# Patient Record
Sex: MALE | Race: WHITE | NOT HISPANIC OR LATINO
[De-identification: names, ages, dates, MRNs, and addresses within clinical notes are randomized per-mention and may not be internally consistent; named-entity substitution may affect disease eponyms.]

---

## 2019-02-11 ENCOUNTER — APPOINTMENT (OUTPATIENT)
Dept: VASCULAR SURGERY | Facility: CLINIC | Age: 64
End: 2019-02-11

## 2020-06-09 ENCOUNTER — TRANSCRIPTION ENCOUNTER (OUTPATIENT)
Age: 65
End: 2020-06-09

## 2020-11-04 ENCOUNTER — TRANSCRIPTION ENCOUNTER (OUTPATIENT)
Age: 65
End: 2020-11-04

## 2022-04-14 ENCOUNTER — APPOINTMENT (OUTPATIENT)
Dept: ORTHOPEDIC SURGERY | Facility: CLINIC | Age: 67
End: 2022-04-14

## 2022-04-15 ENCOUNTER — APPOINTMENT (OUTPATIENT)
Dept: ORTHOPEDIC SURGERY | Facility: CLINIC | Age: 67
End: 2022-04-15
Payer: MEDICARE

## 2022-04-15 VITALS — HEIGHT: 71 IN | BODY MASS INDEX: 28.98 KG/M2 | WEIGHT: 207 LBS

## 2022-04-15 PROCEDURE — 99213 OFFICE O/P EST LOW 20 MIN: CPT

## 2022-04-15 PROCEDURE — 73630 X-RAY EXAM OF FOOT: CPT | Mod: RT

## 2022-04-15 NOTE — ASSESSMENT
[FreeTextEntry1] : Discussed cheilectomy/prox phalanx osteotomy. Full lengh custom orthotics with mortons extension and sesamoid relief.

## 2022-04-15 NOTE — IMAGING
[Bilateral] : foot bilaterally [Weight -] : weightbearing [Degenerative change] : Degenerative change [de-identified] : hallux mtp

## 2022-04-15 NOTE — PHYSICAL EXAM
[Right] : right foot and ankle [Moderate] : moderate swelling of MTP joint/great toe [Left] : left foot and ankle [1st] : 1st [5___] : plantar flexion 5[unfilled]/5 [2+] : dorsalis pedis pulse: 2+ [Decreased] : saphenous nerve sensation decreased [] : no calf tenderness [FreeTextEntry8] : ttp plantar hallux MTPJ medial and lateral sesamoids [de-identified] : MTP joint DF 20 degrees

## 2022-04-15 NOTE — HISTORY OF PRESENT ILLNESS
[Gradual] : gradual [7] : 7 [5] : 5 [de-identified] : 7/14/21: 64 y/o M with ongoing BILAT foot issues since he was young. Pain is worse great toes and bottom feet in the same area. For years he managed his pain with cortisone injections. Insoles. Using gel cushion. New Balance sneakers. He does acupuncture. He cannot walk for exercise any more. Over the past 4 months developed atraumatic onset of RIGHT ankle pain.   Occupation: Retired [employer]  pmh: Neuropathy B/L feet, had EMG, followed by Dr. Yvon Peraza, HTN - controlled, Ureter CA, no DM, d/c tobacco 20 years ago.\par \par 4/15/22: f/u bilateral foot, symptoms getting progressively worse and different. The right side is worse, pain to the ball of the foot underneath the big toe as well as the arch. He has been doing to acupuncture, PT, stretching. Topical creams/aleve. Hx CIPD, Neuropathy. He has been using orthotics/inserts. [] : no [FreeTextEntry1] : bilateral feet, right is worse then the left  [de-identified] : dr navas [de-identified] : xray

## 2022-04-27 ENCOUNTER — APPOINTMENT (OUTPATIENT)
Dept: ORTHOPEDIC SURGERY | Facility: CLINIC | Age: 67
End: 2022-04-27

## 2022-05-18 ENCOUNTER — RESULT REVIEW (OUTPATIENT)
Age: 67
End: 2022-05-18

## 2022-05-25 ENCOUNTER — APPOINTMENT (OUTPATIENT)
Dept: ORTHOPEDIC SURGERY | Facility: CLINIC | Age: 67
End: 2022-05-25
Payer: MEDICARE

## 2022-05-25 VITALS — BODY MASS INDEX: 28.98 KG/M2 | WEIGHT: 207 LBS | HEIGHT: 71 IN

## 2022-05-25 PROCEDURE — 99213 OFFICE O/P EST LOW 20 MIN: CPT

## 2022-05-25 NOTE — HISTORY OF PRESENT ILLNESS
[Gradual] : gradual [0] : 0 [Retired] : Work status: retired [de-identified] : 7/14/21: 64 y/o M with ongoing BILAT foot issues since he was young. Pain is worse great toes and bottom feet in the same area. For years he managed his pain with cortisone injections. Insoles. Using gel cushion. New Balance sneakers. He does acupuncture. He cannot walk for exercise any more. Over the past 4 months developed atraumatic onset of RIGHT ankle pain.   Occupation: Retired [employer] Sales  pmh: Neuropathy B/L feet, had EMG, followed by Dr. Yvon Peraza, HTN - controlled, Ureter CA, no DM, d/c tobacco 20 years ago.\par \par 4/15/22: f/u bilateral foot, symptoms getting progressively worse and different. The right side is worse, pain to the ball of the foot underneath the big toe as well as the arch. He has been doing to acupuncture, PT, stretching. Topical creams/aleve. Hx CIPD, Neuropathy. He has been using orthotics/inserts.\par 5/25/22  f/u rajeev feet.  Has new orthotics/Hoka sneakers [] : no [FreeTextEntry1] : bilateral feet, right is worse then the left  [FreeTextEntry9] : orthotics and new sneakers [de-identified] : prlonged walking [de-identified] : dr navas [de-identified] : xray in the past [de-identified] : pt has gotten orthotics and they are helping. c/o of rt ankle pain

## 2022-05-25 NOTE — PHYSICAL EXAM
[Right] : right foot and ankle [Moderate] : moderate swelling of MTP joint/great toe [Left] : left foot and ankle [1st] : 1st [5___] : plantar flexion 5[unfilled]/5 [2+] : dorsalis pedis pulse: 2+ [Decreased] : saphenous nerve sensation decreased [] : no calf tenderness [FreeTextEntry8] : ttp plantar hallux MTPJ medial and lateral sesamoids [de-identified] : MTP joint DF 20 degrees

## 2022-06-21 ENCOUNTER — NON-APPOINTMENT (OUTPATIENT)
Age: 67
End: 2022-06-21

## 2022-06-21 ENCOUNTER — APPOINTMENT (OUTPATIENT)
Dept: GASTROENTEROLOGY | Facility: CLINIC | Age: 67
End: 2022-06-21

## 2022-06-21 VITALS
SYSTOLIC BLOOD PRESSURE: 130 MMHG | HEART RATE: 80 BPM | OXYGEN SATURATION: 95 % | BODY MASS INDEX: 29.03 KG/M2 | DIASTOLIC BLOOD PRESSURE: 85 MMHG | WEIGHT: 207.38 LBS | HEIGHT: 71 IN

## 2022-06-21 DIAGNOSIS — K21.9 GASTRO-ESOPHAGEAL REFLUX DISEASE W/OUT ESOPHAGITIS: ICD-10-CM

## 2022-06-21 DIAGNOSIS — G61.81 CHRONIC INFLAMMATORY DEMYELINATING POLYNEURITIS: ICD-10-CM

## 2022-06-21 PROCEDURE — 99204 OFFICE O/P NEW MOD 45 MIN: CPT

## 2022-07-05 ENCOUNTER — NON-APPOINTMENT (OUTPATIENT)
Age: 67
End: 2022-07-05

## 2022-07-11 LAB — SARS-COV-2 N GENE NPH QL NAA+PROBE: NOT DETECTED

## 2022-07-12 ENCOUNTER — NON-APPOINTMENT (OUTPATIENT)
Age: 67
End: 2022-07-12

## 2022-07-13 ENCOUNTER — OUTPATIENT (OUTPATIENT)
Dept: OUTPATIENT SERVICES | Facility: HOSPITAL | Age: 67
LOS: 1 days | End: 2022-07-13
Payer: MEDICARE

## 2022-07-13 ENCOUNTER — TRANSCRIPTION ENCOUNTER (OUTPATIENT)
Age: 67
End: 2022-07-13

## 2022-07-13 ENCOUNTER — APPOINTMENT (OUTPATIENT)
Dept: GASTROENTEROLOGY | Facility: HOSPITAL | Age: 67
End: 2022-07-13

## 2022-07-13 ENCOUNTER — NON-APPOINTMENT (OUTPATIENT)
Age: 67
End: 2022-07-13

## 2022-07-13 VITALS
WEIGHT: 205.03 LBS | OXYGEN SATURATION: 98 % | TEMPERATURE: 99 F | HEIGHT: 71 IN | HEART RATE: 66 BPM | DIASTOLIC BLOOD PRESSURE: 84 MMHG | SYSTOLIC BLOOD PRESSURE: 145 MMHG | RESPIRATION RATE: 16 BRPM

## 2022-07-13 DIAGNOSIS — G61.81 CHRONIC INFLAMMATORY DEMYELINATING POLYNEURITIS: Chronic | ICD-10-CM

## 2022-07-13 DIAGNOSIS — Z98.890 OTHER SPECIFIED POSTPROCEDURAL STATES: Chronic | ICD-10-CM

## 2022-07-13 DIAGNOSIS — K21.9 GASTRO-ESOPHAGEAL REFLUX DISEASE WITHOUT ESOPHAGITIS: ICD-10-CM

## 2022-07-13 DIAGNOSIS — S83.511A SPRAIN OF ANTERIOR CRUCIATE LIGAMENT OF RIGHT KNEE, INITIAL ENCOUNTER: Chronic | ICD-10-CM

## 2022-07-13 DIAGNOSIS — Z90.49 ACQUIRED ABSENCE OF OTHER SPECIFIED PARTS OF DIGESTIVE TRACT: Chronic | ICD-10-CM

## 2022-07-13 PROCEDURE — C1889: CPT

## 2022-07-13 PROCEDURE — 91010 ESOPHAGUS MOTILITY STUDY: CPT | Mod: 26

## 2022-07-13 PROCEDURE — 91037 ESOPH IMPED FUNCTION TEST: CPT | Mod: 26

## 2022-07-13 PROCEDURE — 91037 ESOPH IMPED FUNCTION TEST: CPT

## 2022-07-13 PROCEDURE — 91010 ESOPHAGUS MOTILITY STUDY: CPT

## 2022-07-13 DEVICE — CATH VERSAFLEX Z PH: Type: IMPLANTABLE DEVICE | Status: FUNCTIONAL

## 2022-07-13 RX ORDER — PANTOPRAZOLE SODIUM 20 MG/1
1 TABLET, DELAYED RELEASE ORAL
Qty: 0 | Refills: 0 | DISCHARGE

## 2022-07-13 RX ORDER — FENOFIBRATE,MICRONIZED 130 MG
1 CAPSULE ORAL
Qty: 0 | Refills: 0 | DISCHARGE

## 2022-07-13 RX ORDER — ASPIRIN/CALCIUM CARB/MAGNESIUM 324 MG
1 TABLET ORAL
Qty: 0 | Refills: 0 | DISCHARGE

## 2022-07-13 RX ORDER — OLMESARTAN MEDOXOMIL 5 MG/1
1 TABLET, FILM COATED ORAL
Qty: 0 | Refills: 0 | DISCHARGE

## 2022-07-13 RX ORDER — FAMOTIDINE 10 MG/ML
1 INJECTION INTRAVENOUS
Qty: 0 | Refills: 0 | DISCHARGE

## 2022-07-13 RX ORDER — CETIRIZINE HYDROCHLORIDE 10 MG/1
1 TABLET ORAL
Qty: 0 | Refills: 0 | DISCHARGE

## 2022-07-13 RX ORDER — CHOLECALCIFEROL (VITAMIN D3) 125 MCG
5000 CAPSULE ORAL
Qty: 0 | Refills: 0 | DISCHARGE

## 2022-07-13 RX ORDER — AMLODIPINE BESYLATE 2.5 MG/1
1 TABLET ORAL
Qty: 0 | Refills: 0 | DISCHARGE

## 2022-07-13 RX ORDER — PREGABALIN 225 MG/1
1 CAPSULE ORAL
Qty: 0 | Refills: 0 | DISCHARGE

## 2022-07-13 NOTE — ASU PATIENT PROFILE, ADULT - NSICDXPASTSURGICALHX_GEN_ALL_CORE_FT
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

## 2022-07-13 NOTE — ASU DISCHARGE PLAN (ADULT/PEDIATRIC) - NS MD DC FALL RISK RISK
For information on Fall & Injury Prevention, visit: [email or web address] OR  [email or web address] OR  https://www.cdc.gov/steadi/patient.html

## 2022-07-13 NOTE — PRE PROCEDURE NOTE - PRE PROCEDURE EVALUATION
Attending Physician:              Pete Castro MD MSEd    Indication for Procedure:      GERD               PAST MEDICAL & SURGICAL HISTORY:  Pacemaker      Cancer of ureter, right  history of .. 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      S/P ACL surgery      History of repair of ACL            See Allscripts Note for further details  ALLERGIES:  adhesives (Rash; Blisters; Hives)  Drug Allergies Not Recorded    HOME MEDICATIONS:  amLODIPine 5 mg oral tablet: 1 tab(s) orally once a day  aspirin 81 mg oral tablet, chewable: 1 tab(s) orally once a day  famotidine 40 mg oral tablet: 1 tab(s) orally once a day (at bedtime)  fenofibrate 67 mg oral capsule: 1 cap(s) orally once a day  olmesartan 40 mg oral tablet: 1 tab(s) orally once a day  pantoprazole 40 mg oral delayed release tablet: 1 tab(s) orally once a day  pravastatin 80 mg oral tablet: 1 tab(s) orally once a day  Vitamin B-12 500 mcg oral tablet: 1 tab(s) orally once a day  Vitamin D3: 5000 microgram(s) orally  ZyrTEC 10 mg oral tablet: 1 tab(s) orally once a day      See Allscripts Note for further details    AICD/PPM: [X ] yes   [ ] no    PERTINENT LAB DATA:                      PHYSICAL EXAMINATION:    Height (cm): 180.3  Weight (kg): 93  BMI (kg/m2): 28.6  BSA (m2): 2.13T(C): 37.1  HR: 66  BP: 145/84  RR: 16  SpO2: 98%    Constitutional: NAD  HEENT: PERRLA, EOMI,    Neck:  No JVD  Respiratory: CTAB/L  Cardiovascular: S1 and S2  Gastrointestinal: BS+, soft, NT/ND  Extremities: No peripheral edema  Neurological: A/O x 3, no focal deficits  Psychiatric: Normal mood, normal affect  Skin: No rashes    ASA Class: I [ ]  II [X ]  III [ ]  IV [ ]    COMMENTS:    The patient is a suitable candidate for the planned procedure unless box checked [ ]  No, explain:

## 2022-07-14 PROCEDURE — 91038 ESOPH IMPED FUNCT TEST > 1HR: CPT | Mod: 26

## 2022-07-17 PROBLEM — G61.81 CIDP WITH CNS OVERLAP (CHRONIC INFLAMMATORY DEMYELINATING POLYNEURITIS): Status: ACTIVE | Noted: 2022-04-15

## 2022-07-17 NOTE — HISTORY OF PRESENT ILLNESS
[FreeTextEntry1] : 67 yo M pmh HH c GERD, HTN, HL, Chronic inflammatory demyelinating disease referred for GERD evaluation by Dr. Muller.\par \par GERD:\par Feeling heartburn and epigastric pain\par No dysphagia, odynophagia, regurgitation at that time\par Was doing well on Pantoprazole for a few years\par Ultimately came off the PPI and initially was doing okay\par \par However then had return of symptoms, but some evolution\par Felt higher up in chest than in the past\par Had no HH on Barium Esophagram or repeat EGD\par But had incomplete symptom relief with PPI\par \par Currently\par Feels burning is up in the upper esophagus\par There is throat clearing, cough, voice changes\par Feels phlegm in back of neck/throat\par Hoarse voice\par He feels like this is PND - but ENT says there was nothing there\par There is a tightness overall in chest\par \par Denies true dysphagia\par No odynophagia\par Rare regurgitation\par \par On Pantoprazole 40 mg BID + Famotidine 40 mg QHS\par On Zyrtec - improved cough, phlegm\par \par \par Dietary:\par Has been told to avoid - tomatoes, red sauce, caffeine, \par Avoiding spicy foods\par Zero caffeine (may have decaf tea)\par Rare chocolate\par No carbonated beverages\par Avoiding late, heavy meals\par All of these changes have been done along the way\par \par \par Prior Workup:\par EGD 5/2022\par Esophagus normal - bx taken\par Stomach normal - bx taken\par Gastric erythema\par Normal duodenum\par \par Path:\par Chronic active gastritis\par Negative IM, HP\par No EoE\par

## 2022-07-17 NOTE — ASSESSMENT
[FreeTextEntry1] : 67 yo M pmh HH c GERD, HTN, HL, Chronic inflammatory demyelinating disease referred for GERD evaluation by Dr. Muller.  He has GERD despite therapy with majority LPR symptoms.  Next step is pH testing.  Given chest pain and known demyelinating disease, reasonable to check motility at same time\par \par Impression:\par 1) GERD despite therapy - LPR predominant\par 2) Demyelinating disease\par 3) ? HH ?\par \par Plan:\par 1) Emano and pH testing OFF therapy\par 2) All options and testing strategies reviewed at length.  Many questions answered.  Plan agreed upon\par 3) RPV pending above, all other GI care as per primary GI

## 2022-08-03 ENCOUNTER — APPOINTMENT (OUTPATIENT)
Dept: ORTHOPEDIC SURGERY | Facility: CLINIC | Age: 67
End: 2022-08-03

## 2022-08-03 VITALS — WEIGHT: 207 LBS | BODY MASS INDEX: 28.98 KG/M2 | HEIGHT: 71 IN

## 2022-08-03 DIAGNOSIS — M20.22 HALLUX RIGIDUS, LEFT FOOT: ICD-10-CM

## 2022-08-03 DIAGNOSIS — M72.2 PLANTAR FASCIAL FIBROMATOSIS: ICD-10-CM

## 2022-08-03 DIAGNOSIS — M20.21 HALLUX RIGIDUS, RIGHT FOOT: ICD-10-CM

## 2022-08-03 DIAGNOSIS — G62.9 POLYNEUROPATHY, UNSPECIFIED: ICD-10-CM

## 2022-08-03 PROBLEM — C66.1 MALIGNANT NEOPLASM OF RIGHT URETER: Chronic | Status: ACTIVE | Noted: 2022-07-13

## 2022-08-03 PROBLEM — Z95.0 PRESENCE OF CARDIAC PACEMAKER: Chronic | Status: ACTIVE | Noted: 2022-07-13

## 2022-08-03 PROCEDURE — 99213 OFFICE O/P EST LOW 20 MIN: CPT

## 2022-08-03 RX ORDER — GABAPENTIN 100 MG/1
100 CAPSULE ORAL
Qty: 60 | Refills: 0 | Status: ACTIVE | COMMUNITY
Start: 2022-08-03 | End: 1900-01-01

## 2022-08-03 NOTE — DISCUSSION/SUMMARY
[de-identified] : Cont orthotics/rocker shoes and will have his orthotics adjusted. \par Will also try neurontin. \par He is going away in September. Discussed possible CSI prior to his trip.

## 2022-08-03 NOTE — HISTORY OF PRESENT ILLNESS
[Gradual] : gradual [8] : 8 [Burning] : burning [Localized] : localized [Shooting] : shooting [Stabbing] : stabbing [Tightness] : tightness [Physical therapy] : physical therapy [Retired] : Work status: retired [de-identified] : 7/14/21: 66 y/o M with ongoing BILAT foot issues since he was young. Pain is worse great toes and bottom feet in the same area. For years he managed his pain with cortisone injections. Insoles. Using gel cushion. New Balance sneakers. He does acupuncture. He cannot walk for exercise any more. Over the past 4 months developed atraumatic onset of RIGHT ankle pain.   Occupation: Retired [employer]  pmh: Neuropathy B/L feet, had EMG, followed by Dr. Yvon Peraza, HTN - controlled, Ureter CA, no DM, d/c tobacco 20 years ago.\par \par 4/15/22: f/u bilateral foot, symptoms getting progressively worse and different. The right side is worse, pain to the ball of the foot underneath the big toe as well as the arch. He has been doing to acupuncture, PT, stretching. Topical creams/aleve. Hx CIPD, Neuropathy. He has been using orthotics/inserts.\par 5/25/22  f/u rajeev feet.  Has new orthotics/Hoka sneakers\par 8/3/22: f/u rajeev feet. Was using orthotics and feeling better, but now having increasing pain. Also recently had his orthotics adjusted.\par  [] : no [FreeTextEntry1] : bilateral feet, right is worse then the left  [FreeTextEntry9] : orthotics and new sneakers [de-identified] : prplonged walking [de-identified] : dr navas [de-identified] : xray in the past [de-identified] : pt has gotten orthotics and they are helping. c/o of rt ankle pain

## 2022-08-03 NOTE — PHYSICAL EXAM
[Right] : right foot and ankle [Moderate] : moderate swelling of MTP joint/great toe [Left] : left foot and ankle [1st] : 1st [5___] : plantar flexion 5[unfilled]/5 [2+] : dorsalis pedis pulse: 2+ [Decreased] : saphenous nerve sensation decreased [] : no calf tenderness [FreeTextEntry8] : ttp plantar hallux MTPJ medial and lateral sesamoids [de-identified] : MTP joint DF 20 degrees

## 2023-01-04 ENCOUNTER — APPOINTMENT (OUTPATIENT)
Dept: ENDOCRINOLOGY | Facility: CLINIC | Age: 68
End: 2023-01-04
Payer: MEDICARE

## 2023-01-04 VITALS
OXYGEN SATURATION: 96 % | HEART RATE: 84 BPM | SYSTOLIC BLOOD PRESSURE: 130 MMHG | TEMPERATURE: 97.6 F | DIASTOLIC BLOOD PRESSURE: 70 MMHG | HEIGHT: 71 IN

## 2023-01-04 DIAGNOSIS — I10 ESSENTIAL (PRIMARY) HYPERTENSION: ICD-10-CM

## 2023-01-04 DIAGNOSIS — Z95.0 PRESENCE OF CARDIAC PACEMAKER: ICD-10-CM

## 2023-01-04 DIAGNOSIS — I48.91 UNSPECIFIED ATRIAL FIBRILLATION: ICD-10-CM

## 2023-01-04 DIAGNOSIS — E78.00 PURE HYPERCHOLESTEROLEMIA, UNSPECIFIED: ICD-10-CM

## 2023-01-04 PROCEDURE — 99204 OFFICE O/P NEW MOD 45 MIN: CPT

## 2023-01-08 PROBLEM — Z95.0 HISTORY OF PACEMAKER: Status: ACTIVE | Noted: 2022-04-15

## 2023-01-08 NOTE — HISTORY OF PRESENT ILLNESS
[FreeTextEntry1] : Mr. HASMUKH VALENCIA is a 67 year old male who presents for initial endocrine evaluation.  Patient presents with regard to a history o apparent f gynecomastia. Patient states he has noted some enlargement of both breasts over the past year or slightly longer. He feels left breast has increased more so than right breast. He thought this discrepancy may have occu after his PPM was palce in elft upper chest.  Denies breast pain, nipple inversion, or nipple d/c including no bloody nipple d/c. He has not felt any lumps or growths in the breast areas. . He presents via the courtesy of Dr. Sheng Muller.\par \par Latest lab  data did show A1C returned at 6.0%\par \par Family History: mother: hyperlipidemia, hyperthyroidism father: heart disease\par \par Medical History: chronic foot pain b/l with cortisone injections, GERD, hypertension, hyperlipidemia, Chronic inflammatory demyelinating polyradiculoneuropathy, pacemaker and was diagnosed with afib, anxiety, osteoarthritis in the feet, ureter cancer, predm\par \par Medications include famotidine 40 mg bid, dicyclomine 10 mg 3x daily, olmesartan 40 mg daily, amlodipine 5 mg daily, pravastatin 80 mg daily, xarelto\par \par Surgeries: appendectomy, hernia repair, cataract surgery, retina detachment, ureter surgery\par \par Social History: former smoker for 25 years, quit 2002. 2 glasses of wine per day\par \par Pacemaker was placed at [place].

## 2023-01-26 DIAGNOSIS — N62 HYPERTROPHY OF BREAST: ICD-10-CM

## 2023-01-26 LAB
ALBUMIN SERPL ELPH-MCNC: 4.4 G/DL
ALP BLD-CCNC: 68 U/L
ALT SERPL-CCNC: 28 U/L
ANION GAP SERPL CALC-SCNC: 12 MMOL/L
AST SERPL-CCNC: 17 U/L
BILIRUB SERPL-MCNC: 0.4 MG/DL
BUN SERPL-MCNC: 17 MG/DL
CALCIUM SERPL-MCNC: 9.4 MG/DL
CHLORIDE SERPL-SCNC: 104 MMOL/L
CO2 SERPL-SCNC: 25 MMOL/L
CORTIS SERPL-MCNC: 17.8 UG/DL
CREAT SERPL-MCNC: 0.95 MG/DL
EGFR: 87 ML/MIN/1.73M2
ESTRADIOL SERPL-MCNC: 108 PG/ML
GLUCOSE SERPL-MCNC: 118 MG/DL
HCG SERPL-MCNC: <1 MIU/ML
POTASSIUM SERPL-SCNC: 4.2 MMOL/L
PROLACTIN SERPL-MCNC: 14.7 NG/ML
PROT SERPL-MCNC: 6.3 G/DL
SODIUM SERPL-SCNC: 141 MMOL/L
T3FREE SERPL-MCNC: 2.77 PG/ML
T4 FREE SERPL-MCNC: 1 NG/DL
TESTOST FREE SERPL-MCNC: 11.1 PG/ML
TESTOST SERPL-MCNC: 505 NG/DL
TSH SERPL-ACNC: 2.8 UIU/ML

## 2023-01-31 ENCOUNTER — NON-APPOINTMENT (OUTPATIENT)
Age: 68
End: 2023-01-31

## 2023-02-10 ENCOUNTER — NON-APPOINTMENT (OUTPATIENT)
Age: 68
End: 2023-02-10

## 2023-02-10 LAB
ANDROST SERPL-MCNC: 107 NG/DL
DHEA SERPL-MCNC: 96 NG/DL
DHEA-S SERPL-MCNC: 29.4 UG/DL
ESTRADIOL SERPL-MCNC: 133 PG/ML
ESTROGEN SERPL-MCNC: 91 PG/ML
FSH SERPL-MCNC: 5.2 IU/L
LH SERPL-ACNC: 4.2 IU/L
PROGEST SERPL-MCNC: 0.3 NG/ML
SHBG SERPL-SCNC: 46.6 NMOL/L
TESTOST FREE SERPL-MCNC: 8.1 PG/ML
TESTOST SERPL-MCNC: 544 NG/DL

## 2023-02-22 ENCOUNTER — NON-APPOINTMENT (OUTPATIENT)
Age: 68
End: 2023-02-22

## 2023-05-02 ENCOUNTER — NON-APPOINTMENT (OUTPATIENT)
Age: 68
End: 2023-05-02

## 2023-07-22 ENCOUNTER — OFFICE (OUTPATIENT)
Dept: URBAN - METROPOLITAN AREA CLINIC 12 | Facility: CLINIC | Age: 68
Setting detail: OPHTHALMOLOGY
End: 2023-07-22
Payer: MEDICARE

## 2023-07-22 DIAGNOSIS — B30.9: ICD-10-CM

## 2023-07-22 DIAGNOSIS — H04.563: ICD-10-CM

## 2023-07-22 PROCEDURE — 92012 INTRM OPH EXAM EST PATIENT: CPT | Performed by: OPTOMETRIST

## 2023-07-22 ASSESSMENT — REFRACTION_CURRENTRX
OD_CYLINDER: SPHERE
OS_VPRISM_DIRECTION: SV
OS_CYLINDER: +1.25
OS_AXIS: 125
OD_SPHERE: -2.50
OS_SPHERE: +1.25
OD_VPRISM_DIRECTION: SV
OS_OVR_VA: 20/
OD_OVR_VA: 20/

## 2023-07-22 ASSESSMENT — LID EXAM ASSESSMENTS
OD_COMMENTS: INSPISSATED GLANDS
OS_COMMENTS: INSPISSATED GLANDS
OS_BLEPHARITIS: LLL LUL 1+ 2+
OD_BLEPHARITIS: RLL RUL 1+ 2+

## 2023-07-22 ASSESSMENT — VISUAL ACUITY
OD_BCVA: 20/40-1
OS_BCVA: 20/30-2

## 2023-07-22 ASSESSMENT — SUPERFICIAL PUNCTATE KERATITIS (SPK)
OD_SPK: 1+
OS_SPK: 3+

## 2023-07-22 ASSESSMENT — CORNEAL DYSTROPHY - BAND KERATOPATHY: OS_BANDKERATOPATHY: TEMPORAL 1+

## 2023-07-22 ASSESSMENT — KERATOMETRY: METHOD_AUTO_MANUAL: MANUAL

## 2023-07-22 ASSESSMENT — CONFRONTATIONAL VISUAL FIELD TEST (CVF)
OD_FINDINGS: FULL
OS_FINDINGS: FULL

## 2023-07-28 ENCOUNTER — Encounter (OUTPATIENT)
Dept: URBAN - METROPOLITAN AREA CLINIC 112 | Facility: CLINIC | Age: 68
Setting detail: OPHTHALMOLOGY
End: 2023-07-28
Payer: MEDICARE

## 2024-03-14 ENCOUNTER — NEW PATIENT (OUTPATIENT)
Dept: URBAN - METROPOLITAN AREA CLINIC 32 | Facility: CLINIC | Age: 69
End: 2024-03-14

## 2024-03-14 DIAGNOSIS — H04.123: ICD-10-CM

## 2024-03-14 PROCEDURE — 99203 OFFICE O/P NEW LOW 30 MIN: CPT

## 2024-03-14 ASSESSMENT — VISUAL ACUITY
OS_CC: 20/25
OD_CC: 20/30

## 2024-03-14 ASSESSMENT — TONOMETRY
OD_IOP_MMHG: 12
OS_IOP_MMHG: 12

## 2024-09-11 ENCOUNTER — APPOINTMENT (OUTPATIENT)
Dept: ORTHOPEDIC SURGERY | Facility: CLINIC | Age: 69
End: 2024-09-11
Payer: MEDICARE

## 2024-09-11 VITALS — WEIGHT: 190 LBS | BODY MASS INDEX: 26.02 KG/M2 | HEIGHT: 71.5 IN

## 2024-09-11 DIAGNOSIS — M20.22 HALLUX RIGIDUS, LEFT FOOT: ICD-10-CM

## 2024-09-11 DIAGNOSIS — M20.21 HALLUX RIGIDUS, RIGHT FOOT: ICD-10-CM

## 2024-09-11 DIAGNOSIS — M21.41 FLAT FOOT [PES PLANUS] (ACQUIRED), RIGHT FOOT: ICD-10-CM

## 2024-09-11 DIAGNOSIS — G62.9 POLYNEUROPATHY, UNSPECIFIED: ICD-10-CM

## 2024-09-11 PROCEDURE — 73630 X-RAY EXAM OF FOOT: CPT | Mod: RT

## 2024-09-11 PROCEDURE — L4350: CPT | Mod: KX,RT

## 2024-09-11 PROCEDURE — 73600 X-RAY EXAM OF ANKLE: CPT | Mod: RT

## 2024-09-11 PROCEDURE — 99213 OFFICE O/P EST LOW 20 MIN: CPT

## 2024-09-11 RX ORDER — OLMESARTAN MEDOXOMIL / AMLODIPINE BESYLATE / HYDROCHLOROTHIAZIDE 40; 10; 25 MG/1; MG/1; MG/1
TABLET, FILM COATED ORAL
Refills: 0 | Status: ACTIVE | COMMUNITY

## 2024-09-11 RX ORDER — EZETIMIBE 10 MG/1
TABLET ORAL
Refills: 0 | Status: ACTIVE | COMMUNITY

## 2024-09-11 RX ORDER — FAMOTIDINE 10 MG/1
TABLET, FILM COATED ORAL
Refills: 0 | Status: ACTIVE | COMMUNITY

## 2024-09-11 NOTE — HISTORY OF PRESENT ILLNESS
[8] : 8 [5] : 5 [Radiating] : radiating [Shooting] : shooting [Walking] : walking [de-identified] : 7/14/21: 66 y/o M with ongoing BILAT foot issues since he was young. Pain is worse great toes and bottom feet in the same area. For years he managed his pain with cortisone injections. Insoles. Using gel cushion. New Balance sneakers. He does acupuncture. He cannot walk for exercise any more. Over the past 4 months developed atraumatic onset of RIGHT ankle pain.   Occupation: Retired [employer]  pmh: Neuropathy B/L feet, had EMG, followed by Dr. Yvon Peraza, HTN - controlled, Ureter CA, no DM, d/c tobacco 20 years ago.  4/15/22: f/u bilateral foot, symptoms getting progressively worse and different. The right side is worse, pain to the ball of the foot underneath the big toe as well as the arch. He has been doing to acupuncture, PT, stretching. Topical creams/aleve. Hx CIPD, Neuropathy. He has been using orthotics/inserts. 5/25/22  f/u rajeev feet.  Has new orthotics/Hoka sneakers 8/3/22: f/u rajeev feet. Was using orthotics and feeling better, but now having increasing pain. Also recently had his orthotics adjusted.  9/11/24: Here for f/u feet and R ankle. He has been wearing orthotics with relief. Still having occasional pain to great toe mtp joints. He also states his right ankle swells up.   [] : no [FreeTextEntry1] : feet and rt ankle [FreeTextEntry5] : foot/ankle pain for years, has been seen in the past  left foot bone spur, rt ankle swelling and rt foot pain [FreeTextEntry9] : orthotics ,certain sneakers  [de-identified] : Dr. Cunningham

## 2024-09-11 NOTE — REASON FOR VISIT
Pt understands instructions as stated by provider [FreeTextEntry2] : NI bilat  feet and  rt ankle pain

## 2024-09-11 NOTE — PHYSICAL EXAM
[Right] : right foot and ankle [Moderate] : moderate swelling of MTP joint/great toe [Left] : left foot and ankle [1st] : 1st [5___] : plantar flexion 5[unfilled]/5 [2+] : dorsalis pedis pulse: 2+ [Decreased] : saphenous nerve sensation decreased [] : no calf tenderness [FreeTextEntry8] : ttp plantar hallux MTPJ medial and lateral sesamoids [de-identified] : MTP joint DF 20 degrees

## 2024-09-11 NOTE — REVIEW OF SYSTEMS
[Joint Pain] : joint pain [Joint Swelling] : joint swelling [Negative] : Heme/Lymph [FreeTextEntry9] : rt side

## 2024-09-11 NOTE — IMAGING
[Bilateral] : foot bilaterally [Weight -] : weightbearing [FreeTextEntry9] : Right varus tilt [de-identified] : b/l hallux rigidus

## 2024-11-12 ENCOUNTER — OFFICE (OUTPATIENT)
Dept: URBAN - METROPOLITAN AREA CLINIC 103 | Facility: CLINIC | Age: 69
Setting detail: OPHTHALMOLOGY
End: 2024-11-12
Payer: MEDICARE

## 2024-11-12 ENCOUNTER — RX ONLY (RX ONLY)
Age: 69
End: 2024-11-12

## 2024-11-12 DIAGNOSIS — H18.422: ICD-10-CM

## 2024-11-12 DIAGNOSIS — H16.223: ICD-10-CM

## 2024-11-12 DIAGNOSIS — H10.433: ICD-10-CM

## 2024-11-12 PROCEDURE — 99213 OFFICE O/P EST LOW 20 MIN: CPT | Performed by: OPHTHALMOLOGY

## 2024-11-12 ASSESSMENT — SUPERFICIAL PUNCTATE KERATITIS (SPK)
OD_SPK: 1+ 2+
OS_SPK: 1+

## 2024-11-12 ASSESSMENT — REFRACTION_CURRENTRX
OD_VPRISM_DIRECTION: SV
OD_CYLINDER: SPHERE
OS_SPHERE: +1.25
OS_CYLINDER: +1.25
OS_VPRISM_DIRECTION: SV
OD_OVR_VA: 20/
OD_SPHERE: -2.50
OS_OVR_VA: 20/
OS_AXIS: 125

## 2024-11-12 ASSESSMENT — LID EXAM ASSESSMENTS
OD_COMMENTS: INSPISSATED GLANDS
OS_BLEPHARITIS: ABSENT
OD_BLEPHARITIS: ABSENT
OS_COMMENTS: INSPISSATED GLANDS

## 2024-11-12 ASSESSMENT — CORNEAL DYSTROPHY - BAND KERATOPATHY: OS_BANDKERATOPATHY: TEMPORAL 1+

## 2024-11-12 ASSESSMENT — KERATOMETRY: METHOD_AUTO_MANUAL: MANUAL

## 2024-11-12 ASSESSMENT — VISUAL ACUITY
OD_BCVA: 20/40-1
OS_BCVA: 20/30-2

## 2024-11-12 ASSESSMENT — CONFRONTATIONAL VISUAL FIELD TEST (CVF)
OD_FINDINGS: FULL
OS_FINDINGS: FULL

## 2025-02-03 ENCOUNTER — OFFICE (OUTPATIENT)
Dept: URBAN - METROPOLITAN AREA CLINIC 105 | Facility: CLINIC | Age: 70
Setting detail: OPHTHALMOLOGY
End: 2025-02-03
Payer: MEDICARE

## 2025-02-03 ENCOUNTER — RX ONLY (RX ONLY)
Age: 70
End: 2025-02-03

## 2025-02-03 DIAGNOSIS — H18.422: ICD-10-CM

## 2025-02-03 DIAGNOSIS — H25.011: ICD-10-CM

## 2025-02-03 DIAGNOSIS — H25.11: ICD-10-CM

## 2025-02-03 DIAGNOSIS — H16.222: ICD-10-CM

## 2025-02-03 DIAGNOSIS — H10.433: ICD-10-CM

## 2025-02-03 PROCEDURE — 99213 OFFICE O/P EST LOW 20 MIN: CPT | Performed by: OPHTHALMOLOGY

## 2025-02-03 ASSESSMENT — REFRACTION_AUTOREFRACTION
OS_SPHERE: +1.75
OD_CYLINDER: -0.50
OS_CYLINDER: -0.25
OD_SPHERE: -5.00
OS_AXIS: 095
OD_AXIS: 094

## 2025-02-03 ASSESSMENT — REFRACTION_CURRENTRX
OS_VPRISM_DIRECTION: SV
OS_SPHERE: +1.50
OD_SPHERE: -4.75
OD_CYLINDER: -0.75
OD_AXIS: 163
OS_AXIS: 150
OS_OVR_VA: 20/
OS_CYLINDER: -0.50
OD_OVR_VA: 20/
OD_VPRISM_DIRECTION: SV

## 2025-02-03 ASSESSMENT — LID EXAM ASSESSMENTS
OD_BLEPHARITIS: ABSENT
OS_COMMENTS: INSPISSATED GLANDS
OS_BLEPHARITIS: ABSENT
OD_COMMENTS: INSPISSATED GLANDS

## 2025-02-03 ASSESSMENT — CONFRONTATIONAL VISUAL FIELD TEST (CVF)
OS_FINDINGS: FULL
OD_FINDINGS: FULL

## 2025-02-03 ASSESSMENT — SUPERFICIAL PUNCTATE KERATITIS (SPK)
OS_SPK: T
OD_SPK: ABSENT

## 2025-02-03 ASSESSMENT — KERATOMETRY
OS_AXISANGLE_DEGREES: 016
OD_K2POWER_DIOPTERS: 40.75
OD_AXISANGLE_DEGREES: 005
OS_K1POWER_DIOPTERS: 40.25
OD_K1POWER_DIOPTERS: 40.25
OS_K2POWER_DIOPTERS: 40.75
METHOD_AUTO_MANUAL: MANUAL

## 2025-02-03 ASSESSMENT — CORNEAL DYSTROPHY - BAND KERATOPATHY: OS_BANDKERATOPATHY: TEMPORAL 1+

## 2025-02-03 ASSESSMENT — VISUAL ACUITY
OD_BCVA: 20/25
OS_BCVA: 20/40-2

## 2025-02-03 ASSESSMENT — TONOMETRY
OD_IOP_MMHG: 19
OS_IOP_MMHG: 15

## 2025-02-03 ASSESSMENT — CORNEAL DYSTROPHY - POSTERIOR: OS_POSTERIORDYSTROPHY: 1+ GUTTATA

## 2025-03-24 ENCOUNTER — EMERGENCY VISIT (OUTPATIENT)
Age: 70
End: 2025-03-24

## 2025-03-24 DIAGNOSIS — H18.422: ICD-10-CM

## 2025-03-24 DIAGNOSIS — H01.114: ICD-10-CM

## 2025-03-24 DIAGNOSIS — H01.112: ICD-10-CM

## 2025-03-24 DIAGNOSIS — H43.12: ICD-10-CM

## 2025-03-24 DIAGNOSIS — H04.123: ICD-10-CM

## 2025-03-24 DIAGNOSIS — H01.115: ICD-10-CM

## 2025-03-24 DIAGNOSIS — H01.111: ICD-10-CM

## 2025-03-24 PROCEDURE — 99214 OFFICE O/P EST MOD 30 MIN: CPT

## 2025-03-24 RX ORDER — ERYTHROMYCIN 5 MG/G
OINTMENT OPHTHALMIC
Start: 2025-03-24

## 2025-04-24 ENCOUNTER — OFFICE (OUTPATIENT)
Dept: URBAN - METROPOLITAN AREA CLINIC 105 | Facility: CLINIC | Age: 70
Setting detail: OPHTHALMOLOGY
End: 2025-04-24
Payer: MEDICARE

## 2025-04-24 ENCOUNTER — RX ONLY (RX ONLY)
Age: 70
End: 2025-04-24

## 2025-04-24 DIAGNOSIS — H01.001: ICD-10-CM

## 2025-04-24 DIAGNOSIS — H16.223: ICD-10-CM

## 2025-04-24 DIAGNOSIS — H01.004: ICD-10-CM

## 2025-04-24 DIAGNOSIS — H18.422: ICD-10-CM

## 2025-04-24 DIAGNOSIS — H10.433: ICD-10-CM

## 2025-04-24 PROCEDURE — 99213 OFFICE O/P EST LOW 20 MIN: CPT | Performed by: STUDENT IN AN ORGANIZED HEALTH CARE EDUCATION/TRAINING PROGRAM

## 2025-04-24 ASSESSMENT — REFRACTION_AUTOREFRACTION
OS_CYLINDER: -0.25
OD_AXIS: 108
OS_AXIS: 106
OD_CYLINDER: -0.50
OS_SPHERE: +1.50
OD_SPHERE: -5.75

## 2025-04-24 ASSESSMENT — KERATOMETRY
METHOD_AUTO_MANUAL: MANUAL
OS_K1POWER_DIOPTERS: 40.75
OD_AXISANGLE_DEGREES: 016
OS_K2POWER_DIOPTERS: 41.00
OS_AXISANGLE_DEGREES: 035
OD_K1POWER_DIOPTERS: 40.50
OD_K2POWER_DIOPTERS: 40.75

## 2025-04-24 ASSESSMENT — SUPERFICIAL PUNCTATE KERATITIS (SPK)
OD_SPK: 1+
OS_SPK: T

## 2025-04-24 ASSESSMENT — LID EXAM ASSESSMENTS
OD_COMMENTS: INSPISSATED GLANDS
OS_BLEPHARITIS: LUL 1+
OS_COMMENTS: INSPISSATED GLANDS
OD_BLEPHARITIS: RUL 1+

## 2025-04-24 ASSESSMENT — VISUAL ACUITY
OS_BCVA: 20/50
OD_BCVA: 20/25-1

## 2025-04-24 ASSESSMENT — CONFRONTATIONAL VISUAL FIELD TEST (CVF)
OD_FINDINGS: FULL
OS_FINDINGS: FULL

## 2025-04-24 ASSESSMENT — REFRACTION_CURRENTRX
OD_SPHERE: -4.75
OS_VPRISM_DIRECTION: SV
OD_AXIS: 163
OS_OVR_VA: 20/
OS_SPHERE: +1.50
OS_AXIS: 150
OD_VPRISM_DIRECTION: SV
OD_CYLINDER: -0.75
OD_OVR_VA: 20/
OS_CYLINDER: -0.50

## 2025-04-24 ASSESSMENT — CORNEAL DYSTROPHY - BAND KERATOPATHY: OS_BANDKERATOPATHY: TEMPORAL 1+

## 2025-04-24 ASSESSMENT — TONOMETRY
OD_IOP_MMHG: 17
OS_IOP_MMHG: 14

## 2025-04-24 ASSESSMENT — CORNEAL DYSTROPHY - POSTERIOR: OS_POSTERIORDYSTROPHY: 1+ GUTTATA

## (undated) DEVICE — FOLEY HOLDER STATLOCK 2 WAY ADULT

## (undated) DEVICE — TUBING SUCTION CONN 6FT STERILE

## (undated) DEVICE — SYR LUER LOK 20CC

## (undated) DEVICE — SUCTION YANKAUER NO CONTROL VENT

## (undated) DEVICE — SYR LUER LOK 50CC

## (undated) DEVICE — SYR LUER LOK 5CC